# Patient Record
Sex: MALE | Race: WHITE | Employment: FULL TIME
[De-identification: names, ages, dates, MRNs, and addresses within clinical notes are randomized per-mention and may not be internally consistent; named-entity substitution may affect disease eponyms.]

---

## 2017-05-26 ENCOUNTER — HEALTH MAINTENANCE LETTER (OUTPATIENT)
Age: 22
End: 2017-05-26

## 2019-08-22 ENCOUNTER — THERAPY VISIT (OUTPATIENT)
Dept: PHYSICAL THERAPY | Facility: CLINIC | Age: 24
End: 2019-08-22
Payer: COMMERCIAL

## 2019-08-22 DIAGNOSIS — M54.41 ACUTE BILATERAL LOW BACK PAIN WITH RIGHT-SIDED SCIATICA: ICD-10-CM

## 2019-08-22 PROCEDURE — 97140 MANUAL THERAPY 1/> REGIONS: CPT | Mod: GP | Performed by: PHYSICAL THERAPIST

## 2019-08-22 PROCEDURE — 97161 PT EVAL LOW COMPLEX 20 MIN: CPT | Mod: GP | Performed by: PHYSICAL THERAPIST

## 2019-08-22 PROCEDURE — 97110 THERAPEUTIC EXERCISES: CPT | Mod: GP | Performed by: PHYSICAL THERAPIST

## 2019-08-22 NOTE — PROGRESS NOTES
[facility] Initial Evaluation  Subjective:  Pt with primary complaint of low back pain, started years ago, has had issues since age 18. Recently he was at home moving a couch, felt something tweak in the low back and caused the pain to become constant severe pain compared to manageable pain. Pain located in the very low central back, feels the pain more on the R side than the L side. Pain radiates into the hip and upper thigh to the knee as well. Pt denies numbness and tingling at this time, does get it occasionally. Pain rating at 7/10 level currently. He has missed work for the past week due to the pain. Pt gait with B vaulting over feet which he reports due to the pain.     The history is provided by the patient. No  was used.   Type of problem:  Lumbar and sacroiliac   Condition occurred with:  Lifting. This is a recurrent condition    Patient reports pain:  Lower lumbar spine and lumbar spine right. Radiates to:  Gluteals right and thigh right. Associated symptoms:  Loss of motion/stiffness, loss of motion and loss of strength. Symptoms are exacerbated by bending, lifting, twisting, sitting and carrying and relieved by NSAID's and muscle relaxants (stretch out on bed, lidocaine lotion ).    Where condition occurred: at home.  and reported as 7/10 on pain scale. General health as reported by patient is fair. Pertinent medical history includes:  Chemical dependency, overweight, mental illness and depression. Other medical history details: Zoloft, erythromycin, Z pac.    Surgeries include:  None.  Current medications:  None.   Primary job tasks include:  Lifting/carrying, prolonged standing, pushing/pulling and repetitive tasks.  Pain is described as aching and sharp and is constant. Pain is worse in the P.M.. Since onset symptoms are unchanged. Special tests:  X-ray.     Patient is   . Restrictions include:  Working in normal job with  restrictions.    Barriers include:  None as reported by patient.  Red flags:  Pain at rest/night.                      Objective:  System         Lumbar/SI Evaluation  ROM:    AROM Lumbar:   Flexion:          15 deg, incrased pain   Ext:                    15 deg, pain end range, less than flexion    Side Bend:        Left:  To knee joint line, no change    Right:  To knee joint line, pain low back   Rotation:           Left:     Right:   Side Glide:        Left:     Right:       AROM Thoracic: not assessed    Lumbar Myotomes:  normal                  Neural Tension/Mobility:  Neural tension wnl lumbar: SLR and slupm on the L (+) for low back pain only       Right side:   SLR w/DF and Slump positive.  Lumbar Palpation:    Tenderness present at Left:    Quadratus Lumborum; Erector Spinae; Piriformis; PSIS; Gluteus Medius and Vertebral  Tenderness present at Right: PSIS; Gluteus Medius and Vertebral        SI joint/Sacrum:    Low R iliac crest, low R PSIS, (+) L glietts test, (+) L standing flexion test, low L ASIS, (+) clavicular jump test L for up slip, seated flexion test (+) for L on R torsion        Sacral conclusion left:  Sacral torsion                                               General     ROS    Assessment/Plan:    Patient is a 24 year old male with lumbar and sacral complaints.    Patient has the following significant findings with corresponding treatment plan.                Diagnosis 1:  Low back pain   Pain -  hot/cold therapy, manual therapy, self management, education, directional preference exercise and home program  Decreased ROM/flexibility - manual therapy, therapeutic exercise and home program  Decreased joint mobility - manual therapy, therapeutic exercise and home program  Inflammation - cold therapy and self management/home program  Impaired muscle performance - neuro re-education and home program  Decreased function - therapeutic activities and home program    Therapy Evaluation Codes:    1) History comprised of:   Personal factors that impact the plan of care:      Overall behavior pattern, Past/current experiences and Profession.    Comorbidity factors that impact the plan of care are:      Chemical Dependency, Depression, Mental illness and Overweight.     Medications impacting care: Muscle relaxant.  2) Examination of Body Systems comprised of:   Body structures and functions that impact the plan of care:      Lumbar spine and Sacral illiac joint.   Activity limitations that impact the plan of care are:      Bending, Dressing, Lifting, Sitting, Squatting/kneeling, Standing, Walking and Working.  3) Clinical presentation characteristics are:   Stable/Uncomplicated.  4) Decision-Making    Low complexity using standardized patient assessment instrument and/or measureable assessment of functional outcome.  Cumulative Therapy Evaluation is: Low complexity.    Previous and current functional limitations:  (See Goal Flow Sheet for this information)    Short term and Long term goals: (See Goal Flow Sheet for this information)     Communication ability:  Patient appears to be able to clearly communicate and understand verbal and written communication and follow directions correctly.  Treatment Explanation - The following has been discussed with the patient:   RX ordered/plan of care  Anticipated outcomes  Possible risks and side effects  This patient would benefit from PT intervention to resume normal activities.   Rehab potential is good.    Frequency:  1 X week, once daily  Duration:  for 6 weeks  Discharge Plan:  Achieve all LTG.  Independent in home treatment program.  Reach maximal therapeutic benefit.    Please refer to the daily flowsheet for treatment today, total treatment time and time spent performing 1:1 timed codes.

## 2019-08-22 NOTE — LETTER
September 16, 2019    Juan Pablo Reed  [address] [place] [address]    Dear Juan Pablo Reed,    We missed seeing you for your appointment on Aug 22, 2019 at the [facility].  We tried to call your phone, but we could not reach you.    Please call our office at [phone] so we can make sure we have the best phone number for you. Along with this letter, there is a list of your future appointments. When you call, we will also review when your next appointment is. If you do not find a list with this letter, you did not have any appointments scheduled when this letter was mailed.     If we don t hear back from you by 9/22/19, we will cancel your planned appointments with the [facility].     We understand there are times when you may not be able to keep an appointment. When you need to cancel or change an appointment time, please call us as soon as you can. The sooner you call, it gives us a better chance to see other patients. When you call us, we will help you to plan a better time for your appointment. You may reach us by phone at [phone].     We wish you the best in your recovery and hope you will continue to choose the [facility] for your needs.       Sincerely,    [facility]LETIC [facility] PHYSICAL THERAPY  [address] [place] [address]. [address]  [facility] [address]  Phone: [phone]  Fax: [phone]      LULU Appointments in Next Year    Sep 19, 2019  8:00 AM CDT  LULU Spine with Amanda K Hilligoss, PT  [facility] ([facility]  ) [phone]   Sep 26, 2019  8:00 AM CDT  LULU Spine with Amanda K Hilligoss, PT  [facility] ([facility]  ) [phone]

## 2019-08-22 NOTE — LETTER
[facility] ATHLETIC [facility] PHYSICAL THERAPY  [address]  [facility] [id number] [phone]    2019    Re: Juan Pablo Reed   :   1995  MRN:  [id number]   REFERRING PHYSICIAN:   Sy Perdue    [facility] ATHLETIC [facility]    Date of Initial Evaluation:  19  Visits:  Rxs Used: 1  Reason for Referral:  Acute bilateral low back pain with right-sided sciatica    EVALUATION SUMMARY    [facility] Initial Evaluation  Subjective:  Pt with primary complaint of low back pain, started years ago, has had issues since age 18. Recently he was at home moving a couch, felt something tweak in the low back and caused the pain to become constant severe pain compared to manageable pain. Pain located in the very low central back, feels the pain more on the R side than the L side. Pain radiates into the hip and upper thigh to the knee as well. Pt denies numbness and tingling at this time, does get it occasionally. Pain rating at 7/10 level currently. He has missed work for the past week due to the pain. Pt gait with B vaulting over feet which he reports due to the pain.     The history is provided by the patient. No  was used.   Type of problem:  Lumbar and sacroiliac   Condition occurred with:  Lifting. This is a recurrent condition    Patient reports pain:  Lower lumbar spine and lumbar spine right. Radiates to:  Gluteals right and thigh right. Associated symptoms:  Loss of motion/stiffness, loss of motion and loss of strength. Symptoms are exacerbated by bending, lifting, twisting, sitting and carrying and relieved by NSAID's and muscle relaxants (stretch out on bed, lidocaine lotion ).    Where condition occurred: at home.  and reported as 7/10 on pain scale. General health as reported by patient is fair. Pertinent medical history includes:  Chemical dependency, overweight, mental illness and  depression. Other medical history details: Zoloft, erythromycin, Z pac.    Surgeries include:  None.  Current medications:  None.   Primary job tasks include:  Lifting/carrying, prolonged standing, pushing/pulling and repetitive tasks.  Pain is described as aching and sharp and is constant. Pain is worse in the P.M.. Since onset symptoms are unchanged. Special tests:  X-ray.     Patient is   . Restrictions include:  Working in normal job with restrictions.    Barriers include:  None as reported by patient.  Red flags:  Pain at rest/night.                          Objective:    Lumbar/SI Evaluation  ROM:    AROM Lumbar:   Flexion:          15 deg, incrased pain   Ext:                    15 deg, pain end range, less than flexion    Side Bend:        Left:  To knee joint line, no change    Right:  To knee joint line, pain low back   Rotation:           Left:     Right:   Side Glide:        Left:     Right:   AROM Thoracic: not assessed    Lumbar Myotomes:  normal    Neural Tension/Mobility:  Neural tension wnl lumbar: SLR and slupm on the L (+) for low back pain only     Right side:   SLR w/DF and Slump positive.    Lumbar Palpation:    Tenderness present at Left:    Quadratus Lumborum; Erector Spinae; Piriformis; PSIS; Gluteus Medius and Vertebral  Tenderness present at Right: PSIS; Gluteus Medius and Vertebral    SI joint/Sacrum:    Low R iliac crest, low R PSIS, (+) L glietts test, (+) L standing flexion test, low L ASIS, (+) clavicular jump test L for up slip, seated flexion test (+) for L on R torsion  Sacral conclusion left:  Sacral torsion      Assessment/Plan:    Patient is a 24 year old male with lumbar and sacral complaints.    Patient has the following significant findings with corresponding treatment plan.                Diagnosis 1:  Low back pain   Pain -  hot/cold therapy, manual therapy, self management, education, directional preference exercise and home program  Decreased  ROM/flexibility - manual therapy, therapeutic exercise and home program  Decreased joint mobility - manual therapy, therapeutic exercise and home program  Inflammation - cold therapy and self management/home program  Impaired muscle performance - neuro re-education and home program  Decreased function - therapeutic activities and home program    Therapy Evaluation Codes:   1) History comprised of:   Personal factors that impact the plan of care:      Overall behavior pattern, Past/current experiences and Profession.    Comorbidity factors that impact the plan of care are:      Chemical Dependency, Depression, Mental illness and Overweight.     Medications impacting care: Muscle relaxant.  2) Examination of Body Systems comprised of:   Body structures and functions that impact the plan of care:      Lumbar spine and Sacral illiac joint.   Activity limitations that impact the plan of care are:      Bending, Dressing, Lifting, Sitting, Squatting/kneeling, Standing, Walking and Working.    3) Clinical presentation characteristics are:   Stable/Uncomplicated.  4) Decision-Making    Low complexity using standardized patient assessment instrument and/or measureable assessment of functional outcome.  Cumulative Therapy Evaluation is: Low complexity.    Previous and current functional limitations:  (See Goal Flow Sheet for this information)    Short term and Long term goals: (See Goal Flow Sheet for this information)     Communication ability:  Patient appears to be able to clearly communicate and understand verbal and written communication and follow directions correctly.  Treatment Explanation - The following has been discussed with the patient:   RX ordered/plan of care  Anticipated outcomes  Possible risks and side effects  This patient would benefit from PT intervention to resume normal activities.   Rehab potential is good.    Frequency:  1 X week, once daily  Duration:  for 6 weeks  Discharge Plan:  Achieve all  LTG.  Independent in home treatment program.  Reach maximal therapeutic benefit.    Thank you for your referral.    INQUIRIES  Therapist: Yogesh Gomez [facility] [place] [facility]  [address]  [facility] [address]  Phone: [phone]  Fax: [phone]

## 2019-10-07 PROBLEM — M54.41 ACUTE BILATERAL LOW BACK PAIN WITH RIGHT-SIDED SCIATICA: Status: RESOLVED | Noted: 2019-08-22 | Resolved: 2019-10-07
